# Patient Record
Sex: FEMALE | Race: WHITE | URBAN - METROPOLITAN AREA
[De-identification: names, ages, dates, MRNs, and addresses within clinical notes are randomized per-mention and may not be internally consistent; named-entity substitution may affect disease eponyms.]

---

## 2019-12-26 ENCOUNTER — HISTORICAL (OUTPATIENT)
Dept: SURGERY | Facility: HOSPITAL | Age: 44
End: 2019-12-26

## 2019-12-26 LAB
APPEARANCE, UA: CLEAR
BACTERIA SPEC CULT: ABNORMAL /HPF
BILIRUB UR QL STRIP: NEGATIVE
BUN SERPL-MCNC: 12 MG/DL (ref 7–18)
CALCIUM SERPL-MCNC: 9.5 MG/DL (ref 8.5–10.1)
CHLORIDE SERPL-SCNC: 108 MMOL/L (ref 98–107)
CO2 SERPL-SCNC: 21 MMOL/L (ref 21–32)
COLOR UR: YELLOW
CREAT SERPL-MCNC: 0.84 MG/DL (ref 0.55–1.02)
CREAT/UREA NIT SERPL: 14.3
ERYTHROCYTE [DISTWIDTH] IN BLOOD BY AUTOMATED COUNT: 14.9 % (ref 11.5–17)
GLUCOSE (UA): NEGATIVE
GLUCOSE SERPL-MCNC: 115 MG/DL (ref 74–106)
HCT VFR BLD AUTO: 48 % (ref 37–47)
HGB BLD-MCNC: 14.4 GM/DL (ref 12–16)
HGB UR QL STRIP: ABNORMAL
KETONES UR QL STRIP: NEGATIVE
LEUKOCYTE ESTERASE UR QL STRIP: NEGATIVE
MCH RBC QN AUTO: 27.5 PG (ref 27–31)
MCHC RBC AUTO-ENTMCNC: 30 GM/DL (ref 33–36)
MCV RBC AUTO: 91.8 FL (ref 80–94)
NITRITE UR QL STRIP: NEGATIVE
PH UR STRIP: 5.5 [PH] (ref 5–9)
PLATELET # BLD AUTO: 298 X10(3)/MCL (ref 130–400)
PMV BLD AUTO: 10.1 FL (ref 9.4–12.4)
POTASSIUM SERPL-SCNC: 4.6 MMOL/L (ref 3.5–5.1)
PROT UR QL STRIP: NEGATIVE
RBC # BLD AUTO: 5.23 X10(6)/MCL (ref 4.2–5.4)
RBC #/AREA URNS HPF: ABNORMAL /[HPF]
SODIUM SERPL-SCNC: 138 MMOL/L (ref 136–145)
SP GR UR STRIP: 1.02 (ref 1–1.03)
SQUAMOUS EPITHELIAL, UA: ABNORMAL
UROBILINOGEN UR STRIP-ACNC: 0.2
WBC # SPEC AUTO: 8.3 X10(3)/MCL (ref 4.5–11.5)
WBC #/AREA URNS HPF: ABNORMAL /[HPF]

## 2020-01-24 ENCOUNTER — HISTORICAL (OUTPATIENT)
Dept: ADMINISTRATIVE | Facility: HOSPITAL | Age: 45
End: 2020-01-24

## 2020-01-24 LAB — GROUP & RH: NORMAL

## 2022-04-30 NOTE — OP NOTE
DATE OF SURGERY:    01/24/2020    SURGEON:  Oxana Chahal MD  ASSISTANT:  None    PREOPERATIVE DIAGNOSES:    1. Symptomatic uterine fibroids.   2. Menorrhagia.   3. Pelvic pain.   4. Right ovarian cyst.    POSTOPERATIVE DIAGNOSES:    1. Symptomatic uterine fibroids.   2. Menorrhagia.   3. Pelvic pain.   4. Right ovarian cyst.   5. Endometriosis of the uterus.    PROCEDURES:  Da Horacio-assisted total laparoscopic hysterectomy with bilateral salpingo-oophorectomy.    ANESTHESIA:  General.    ESTIMATED BLOOD LOSS:  25 mL.    COMPLICATIONS:  None.    SPECIMENS REMOVED:  Uterus with cervix, bilateral fallopian tubes and ovaries.    PROCEDURE IN DETAIL:  After confirming appropriate consent for surgery, the patient was transferred to the operative suite and placed supine on the operating table.  Lower limbs were immobilized.  The patient was then placed under general anesthesia by rapid sequence induction with atraumatic intubation.  The patient was then replaced in the dorsal lithotomy position.  She was prepped and draped in the usual sterile fashion.  Time-out was then performed.     A speculum was then placed into the vaginal vault, and the anterior lip of the cervix was grasped with a single-tooth tenaculum.  The uterus was sounded to a depth of 8 cm.  Therefore, an 8 cm DERIK arch manipulator tip was selected with a medium-sized RANDALL ring.  Stay sutures of 0 Prolene were placed at the 3 and 9 o'clock positions and held with hemostats.  The cervix was then dilated to a diameter of 5 mm, upon which the tenaculum was removed and the uterine manipulator was then inserted to the uterus, and the balloon was insufflated easily.  The speculum was then removed from the vagina.  A Peter catheter was placed, with return of clear urine.     Attention was then turned to the patient's abdomen, where an 8 mm infraumbilical skin incision was made with the scalpel.  An 8 mm trocar with a laparoscope was inserted into the  abdomen under direct visualization.  A pneumoperitoneum was obtained using approximately 3.5 L of carbon dioxide gas.  Once this was accomplished, a secondary incision was then made in the right lower abdomen, 8 mm in length.  Another 8 mm trocar was inserted into the abdomen under direct visualization.  Similarly, on the left side, an incision was made 8 mm in length, followed by another 8 mm trocar.  A final incision was then made in the right upper quadrant, 12 mm in length, where a 12 mm trocar was inserted into the abdomen under direct visualization.  At this point, the da Horacio robot was then docked to the patient, and I then took a seat at the [employer] Horacio robotic console.     Using a vessel sealer in the left hand and scissors in the right hand, a survey of the pelvic anatomy revealed a uterus that was enlarged and somewhat boggy in its consistence.  Fallopian tubes were grossly normal bilaterally.  Ovaries appeared to be normal at this point in time.  There was a spot of endometriosis on the posterior aspect of the uterus.  Beginning on the left side, the round ligament pedicle was then taken down using the vessel sealer.  The fallopian tube and ovary were elevated on the left.  The ureter was easily identified and traced, and with care to avoid the structure, the infundibulopelvic ligament was then taken down using the vessel sealer.  The dissection was then carried all the way until the round ligament was reached.  Once this was accomplished, the peritoneal reflection was  anteriorly, and the vessel sealer was then used to dissect the peritoneal reflection, creating a bladder flap over the KOH ring anteriorly all the way to the contralateral side.  Once this was achieved, the round ligament pedicle was then taken down on the right side using the vessel sealer.  Similar to the other side, the tube and ovary were elevated, and again with the ureter easily identified and avoided, the infundibulopelvic  ligament was taken down using the vessel sealer.     Once this was accomplished, the bladder was then dissected off the KOH ring using sharp dissection.  The uterine arteries were then skeletonized bilaterally, and were then cauterized x3 bilaterally using the vessel sealer.  At this point, the uterus was noted to chris.  A circumferential incision was then made using the scissors over the KOH ring, creating a colpotomy and completely excising the specimen.  Once this was accomplished, the uterus with cervix, fallopian tubes, and ovaries were delivered through the vagina with ease.     With the pneumoperitoneum maintained, the vaginal cuff was inspected, and no bleeding was noted.  The instruments were then switched out for a fenestrated bipolar in the left hand and a thomas needle  in the right.  A V-Loc suture was introduced into the abdomen and was used to close the vaginal cuff in a non-locking fashion.  Once this was accomplished, the abdomen was thoroughly irrigated with warm saline, and all pedicles were inspected and noted to be hemostatic.  Ureters were again identified at the end of the case and were free from the area of dissection.  The bladder was then filled with 120 cc of methylene blue solution, with no intra-abdominal spill noted.  At this point, the instruments were then removed from the abdomen and the da Horacio robot was undocked from the patient.     I then scrubbed back in.  A suture of 0 Vicryl was placed in the 12 mm incision in a figure-of-eight fashion.  The skin edges on all 4 incisions were then closed with 4-0 Vicryl in a subcuticular stitch, and Dermabond applied across all 4 incisions.  Sponge, needle, and instrument counts were correct x2.  Estimated blood loss was 25 mL.  The patient tolerated the procedure well and went to Recovery in stable condition.        ______________________________  MD JESSE Kong/BO  DD:  01/24/2020  Time:  09:50AM  DT:  01/24/2020  Time:   10:32AM  Job #:  [id number]